# Patient Record
Sex: FEMALE
[De-identification: names, ages, dates, MRNs, and addresses within clinical notes are randomized per-mention and may not be internally consistent; named-entity substitution may affect disease eponyms.]

---

## 2021-04-17 ENCOUNTER — NURSE TRIAGE (OUTPATIENT)
Dept: OTHER | Facility: CLINIC | Age: 61
End: 2021-04-17

## 2021-04-17 NOTE — TELEPHONE ENCOUNTER
Reason for Disposition   [1] MODERATE dizziness (e.g., vertigo; feels very unsteady, interferes with normal activities) AND [2] has NOT been evaluated by physician for this    Answer Assessment - Initial Assessment Questions  1. DESCRIPTION: \"Describe your dizziness. \"      Patient describes \"world as spinning around\" her    2. VERTIGO: \"Do you feel like either you or the room is spinning or tilting? \"       Confirms    3. LIGHTHEADED: \"Do you feel lightheaded? \" (e.g., somewhat faint, woozy, weak upon standing)      Less lightheaded- more vertigo per patient    4. SEVERITY: \"How bad is it? \"  \"Can you walk? \"    - MILD - Feels unsteady but walking normally. - MODERATE - Feels very unsteady when walking, but not falling; interferes with normal activities (e.g., school, work) . - SEVERE - Unable to walk without falling (requires assistance). Moderate- feels unsteady    5. ONSET:  \"When did the dizziness begin? \"      Symptoms started on 2:00 am Friday    6. AGGRAVATING FACTORS: \"Does anything make it worse? \" (e.g., standing, change in head position)     Standing up, changing head position    7. CAUSE: \"What do you think is causing the dizziness? \"      Unsure    8. RECURRENT SYMPTOM: \"Have you had dizziness before? \" If so, ask: \"When was the last time? \" \"What happened that time? \"      Denies having symptoms like this before    9. OTHER SYMPTOMS: \"Do you have any other symptoms? \" (e.g., headache, weakness, numbness, vomiting, earache)      When moving and dizziness occurs, patient feels nauseous, has not vomited, Bilateral ears feel congested patient feels like \"need to pop ears. \"    10. PREGNANCY: \"Is there any chance you are pregnant? \" \"When was your last menstrual period? \"       N/A    Protocols used: DIZZINESS - VERTIGO-ADULT-    Brief description of triage: See above. Triage indicates for patient to be seen by PCP within 24 hours. Per patient PCP office is currently closed.   Patient will go to an urgent care to be evaluated. Two urgent care's found for patient within the [facility]. Address and numbers given. Care advice provided, patient verbalizes understanding; denies any other questions or concerns; instructed to call back for any new or worsening symptoms. Attention Provider: Thank you for allowing me to participate in the care of your patient. The patient was connected to triage in response to symptoms provided. Please do not respond through this encounter as the response is not directed to a shared pool.